# Patient Record
Sex: FEMALE | Race: WHITE | NOT HISPANIC OR LATINO | Employment: FULL TIME | URBAN - METROPOLITAN AREA
[De-identification: names, ages, dates, MRNs, and addresses within clinical notes are randomized per-mention and may not be internally consistent; named-entity substitution may affect disease eponyms.]

---

## 2023-04-06 LAB
C REACTIVE PROTEIN (MG/L) IN SER/PLAS: 0.74 MG/DL
RHEUMATOID FACTOR (IU/ML) IN SERUM OR PLASMA: <10 IU/ML (ref 0–15)
SEDIMENTATION RATE, ERYTHROCYTE: 20 MM/H (ref 0–30)

## 2023-04-10 LAB — CITRULLINE ANTIBODY, IGG: 3 UNITS (ref 0–19)

## 2023-10-08 DIAGNOSIS — R00.2 PALPITATIONS: ICD-10-CM

## 2023-10-09 RX ORDER — NALOXONE HYDROCHLORIDE 4 MG/.1ML
4 SPRAY NASAL AS NEEDED
COMMUNITY
Start: 2022-10-27

## 2023-10-09 RX ORDER — GABAPENTIN 300 MG/1
300 CAPSULE ORAL 3 TIMES DAILY
COMMUNITY
End: 2023-11-16 | Stop reason: WASHOUT

## 2023-10-09 RX ORDER — AMLODIPINE BESYLATE 2.5 MG/1
2.5 TABLET ORAL DAILY
COMMUNITY

## 2023-10-09 RX ORDER — OXYCODONE AND ACETAMINOPHEN 5; 325 MG/1; MG/1
1 TABLET ORAL 3 TIMES DAILY
COMMUNITY

## 2023-10-09 RX ORDER — DOXYCYCLINE 40 MG/1
40 CAPSULE ORAL
COMMUNITY
Start: 2013-09-25

## 2023-10-09 RX ORDER — LEVOTHYROXINE SODIUM 75 UG/1
75 TABLET ORAL DAILY
COMMUNITY
End: 2024-03-25 | Stop reason: SDUPTHER

## 2023-10-09 RX ORDER — FUROSEMIDE 20 MG/1
20 TABLET ORAL DAILY PRN
COMMUNITY
Start: 2023-05-25 | End: 2024-02-16

## 2023-10-09 RX ORDER — METOPROLOL SUCCINATE 25 MG/1
25 TABLET, EXTENDED RELEASE ORAL DAILY
COMMUNITY
End: 2023-11-16 | Stop reason: WASHOUT

## 2023-10-09 RX ORDER — SIMVASTATIN 40 MG/1
40 TABLET, FILM COATED ORAL NIGHTLY
COMMUNITY
End: 2024-04-19 | Stop reason: SDUPTHER

## 2023-10-09 RX ORDER — OXYCODONE AND ACETAMINOPHEN 7.5; 325 MG/1; MG/1
1 TABLET ORAL EVERY 6 HOURS PRN
COMMUNITY
Start: 2023-08-16 | End: 2023-11-15 | Stop reason: WASHOUT

## 2023-10-09 RX ORDER — METHOCARBAMOL 750 MG/1
750 TABLET, FILM COATED ORAL 4 TIMES DAILY PRN
COMMUNITY
End: 2023-11-15 | Stop reason: WASHOUT

## 2023-10-09 RX ORDER — DULOXETIN HYDROCHLORIDE 60 MG/1
60 CAPSULE, DELAYED RELEASE ORAL DAILY
COMMUNITY
End: 2024-04-05 | Stop reason: SDUPTHER

## 2023-10-10 RX ORDER — METOPROLOL TARTRATE 25 MG/1
25 TABLET, FILM COATED ORAL 2 TIMES DAILY
Qty: 60 TABLET | Refills: 11 | Status: SHIPPED | OUTPATIENT
Start: 2023-10-10 | End: 2023-10-16 | Stop reason: SDUPTHER

## 2023-10-16 DIAGNOSIS — R00.2 PALPITATIONS: ICD-10-CM

## 2023-10-16 RX ORDER — METOPROLOL TARTRATE 25 MG/1
25 TABLET, FILM COATED ORAL DAILY
Qty: 30 TABLET | Refills: 11 | Status: SHIPPED | OUTPATIENT
Start: 2023-10-16 | End: 2024-10-15

## 2023-11-15 PROBLEM — E78.2 MIXED HYPERLIPIDEMIA: Status: ACTIVE | Noted: 2023-11-15

## 2023-11-15 PROBLEM — S83.242A ACUTE MEDIAL MENISCUS TEAR OF LEFT KNEE: Status: ACTIVE | Noted: 2018-04-05

## 2023-11-15 PROBLEM — M19.90 OSTEOARTHRITIS: Status: ACTIVE | Noted: 2023-11-15

## 2023-11-15 PROBLEM — M79.7 FIBROMYALGIA MUSCLE PAIN: Status: ACTIVE | Noted: 2023-11-15

## 2023-11-15 PROBLEM — L30.9 DERMATITIS: Status: ACTIVE | Noted: 2023-11-15

## 2023-11-15 PROBLEM — N80.03 ADENOMYOSIS OF THE UTERUS: Status: ACTIVE | Noted: 2023-11-02

## 2023-11-15 PROBLEM — I49.3 PVC'S (PREMATURE VENTRICULAR CONTRACTIONS): Status: ACTIVE | Noted: 2017-02-08

## 2023-11-15 PROBLEM — L71.9 ROSACEA: Status: ACTIVE | Noted: 2023-11-15

## 2023-11-15 PROBLEM — I10 ESSENTIAL HYPERTENSION: Status: ACTIVE | Noted: 2023-11-15

## 2023-11-15 PROBLEM — K55.1 CHRONIC VASCULAR INSUFFICIENCY OF INTESTINE (MULTI): Status: ACTIVE | Noted: 2023-11-15

## 2023-11-15 PROBLEM — E03.9 HYPOTHYROIDISM: Status: ACTIVE | Noted: 2023-11-15

## 2023-11-15 PROBLEM — Z96.653: Status: ACTIVE | Noted: 2018-03-19

## 2023-11-15 PROBLEM — E66.812 OBESITY, CLASS II, BMI 35-39.9: Status: ACTIVE | Noted: 2023-07-20

## 2023-11-15 PROBLEM — D25.1 INTRAMURAL UTERINE FIBROID: Status: ACTIVE | Noted: 2023-11-02

## 2023-11-15 PROBLEM — M54.16 LUMBAR RADICULOPATHY: Status: ACTIVE | Noted: 2023-11-15

## 2023-11-15 PROBLEM — E66.9 OBESITY, CLASS II, BMI 35-39.9: Status: ACTIVE | Noted: 2023-07-20

## 2023-11-15 PROBLEM — M75.51 BURSITIS OF RIGHT SHOULDER: Status: ACTIVE | Noted: 2022-03-08

## 2023-11-15 PROBLEM — R07.9 CHEST PAIN: Status: ACTIVE | Noted: 2023-11-15

## 2023-11-15 PROBLEM — G47.33 OSA ON CPAP: Status: ACTIVE | Noted: 2021-07-29

## 2023-11-15 PROBLEM — K63.5 COLON POLYPS: Status: ACTIVE | Noted: 2023-11-15

## 2023-11-15 PROBLEM — R00.2 PALPITATIONS: Status: ACTIVE | Noted: 2017-02-08

## 2023-11-15 PROBLEM — M25.551 PAIN OF RIGHT HIP JOINT: Status: ACTIVE | Noted: 2023-11-15

## 2023-11-15 RX ORDER — CYCLOBENZAPRINE HCL 10 MG
10 TABLET ORAL 3 TIMES DAILY
COMMUNITY
Start: 2023-09-08 | End: 2024-02-27 | Stop reason: WASHOUT

## 2023-11-15 RX ORDER — NAPROXEN 250 MG/1
250 TABLET ORAL
COMMUNITY

## 2023-11-16 ENCOUNTER — OFFICE VISIT (OUTPATIENT)
Dept: CARDIOLOGY | Facility: CLINIC | Age: 58
End: 2023-11-16
Payer: COMMERCIAL

## 2023-11-16 VITALS
BODY MASS INDEX: 42.96 KG/M2 | DIASTOLIC BLOOD PRESSURE: 78 MMHG | WEIGHT: 218.8 LBS | HEIGHT: 60 IN | HEART RATE: 90 BPM | SYSTOLIC BLOOD PRESSURE: 124 MMHG

## 2023-11-16 DIAGNOSIS — R00.2 PALPITATIONS: ICD-10-CM

## 2023-11-16 DIAGNOSIS — I10 ESSENTIAL HYPERTENSION: Primary | ICD-10-CM

## 2023-11-16 DIAGNOSIS — I49.3 PVC'S (PREMATURE VENTRICULAR CONTRACTIONS): ICD-10-CM

## 2023-11-16 DIAGNOSIS — E78.2 MIXED HYPERLIPIDEMIA: ICD-10-CM

## 2023-11-16 PROCEDURE — 99213 OFFICE O/P EST LOW 20 MIN: CPT | Performed by: NURSE PRACTITIONER

## 2023-11-16 PROCEDURE — 3078F DIAST BP <80 MM HG: CPT | Performed by: NURSE PRACTITIONER

## 2023-11-16 PROCEDURE — 3074F SYST BP LT 130 MM HG: CPT | Performed by: NURSE PRACTITIONER

## 2023-11-16 RX ORDER — GABAPENTIN 600 MG/1
600 TABLET ORAL 2 TIMES DAILY
COMMUNITY
Start: 2023-10-25

## 2023-11-16 ASSESSMENT — ENCOUNTER SYMPTOMS
DIZZINESS: 0
PALPITATIONS: 0
RESPIRATORY NEGATIVE: 1
BACK PAIN: 1
NUMBNESS: 0
GASTROINTESTINAL NEGATIVE: 1
HEADACHES: 0
HEMATOLOGIC/LYMPHATIC NEGATIVE: 1
CARDIOVASCULAR NEGATIVE: 1
LIGHT-HEADEDNESS: 0
FACIAL ASYMMETRY: 0
CONFUSION: 0
ALLERGIC/IMMUNOLOGIC NEGATIVE: 1
ACTIVITY CHANGE: 0
ENDOCRINE NEGATIVE: 1
PSYCHIATRIC NEGATIVE: 1
APPETITE CHANGE: 0
EYES NEGATIVE: 1
ARTHRALGIAS: 1

## 2023-11-16 NOTE — PROGRESS NOTES
Светлана Hewitt is a 58 y.o. female     History Of Present Illness   Patient is here today for a 6 month follow up on labs      History of Present Illness  11/16/23:Mrs Hewitt is an obese female with a PMH of hypothyroidism, hypertension and osteoarthritis, here for a routine follow up of her hypertension and H/O palpitations. She denies any complaints of chest pain, shortness of breath, edema, dizziness or syncope.    5/25/23: Mrs Hewitt is an obese female with a PMH of hypothyroidism, hypertension and osteoarthritis, here for a routine follow up of her hypertension and H/O palpitations. She denies any complaints of chest pain, shortness of breath, edema, dizziness or syncope. She has very brief episodes of palpitations that only last seconds and resolve on their own. Her BP is well controlled. She will be followed by pain management for chronic back pain radiating to her legs. On occasion , she is having episodes of weight gain and lower extremity edema.      11/22/2022: Ms Hewitt is here for a follow up of her complaints of palpitations. At her last visit, she was started on metoprolol. She states her episodes of palpitations have been much improved. Her last episode was November 7, 2022. Her apple watch showed NSR during these events. She denies any complaints of chest pain or shortness of breath. She does have mild lower extremity edema.     10/25/22. Ms Hewitt is here for a follow up of her complaints of palpitations and to review the results of her testing. Since her last visit, she is complaining of significant palpitations that occur 7-10 times a day and last for <10 seconds. She denies any complaints of chest pain, shortness of breath, lower extremity edema, dizziness or syncope.       9/1/22 Mrs Hewitt is an obese female with a PMH of hypothyroidism, hypertension and osteoarthritis, here for concern of /pronounced palpitations that started 1 1/2 months ago and occur frequently throughout  the day with chest tightness and flushing of her skin. She feels the palpitations are more pronounced when she lays done. these episodes are lsting 3-5 seconds.  She denies any symptoms when she exercises..      She denies any H/O MI, CAD, HF, DM, CVA OR TIA     No history of COVID         Social HX          Family HX  Her father has a H/O CAD with CABG at a young age.       Review Of Systems   Review of Systems   Constitutional:  Negative for activity change and appetite change.   Eyes: Negative.    Respiratory: Negative.     Cardiovascular: Negative.  Negative for chest pain, palpitations and leg swelling.   Gastrointestinal: Negative.    Endocrine: Negative.  Negative for cold intolerance and heat intolerance.   Genitourinary: Negative.    Musculoskeletal:  Positive for arthralgias and back pain.   Skin: Negative.  Negative for pallor.   Allergic/Immunologic: Negative.    Neurological:  Negative for dizziness, syncope, facial asymmetry, light-headedness, numbness and headaches.   Hematological: Negative.    Psychiatric/Behavioral: Negative.  Negative for behavioral problems and confusion.           Allergies  Allergies   Allergen Reactions    Codeine Hives    Cephalexin Rash    Ciprofloxacin Rash    Nabumetone Rash    Penicillins Rash    Sulfa (Sulfonamide Antibiotics) Rash    Valdecoxib Rash          Vitals  There were no vitals taken for this visit.        Physical Exam  Physical Exam  Constitutional:       Appearance: Normal appearance. She is obese.   HENT:      Head: Normocephalic and atraumatic.      Nose: Nose normal.      Mouth/Throat:      Mouth: Mucous membranes are dry.   Eyes:      Extraocular Movements: Extraocular movements intact.      Pupils: Pupils are equal, round, and reactive to light.   Cardiovascular:      Rate and Rhythm: Normal rate and regular rhythm.      Pulses: Normal pulses.      Heart sounds: No murmur heard.     Friction rub present. No gallop.   Pulmonary:      Effort: Pulmonary  effort is normal.      Breath sounds: Normal breath sounds.   Abdominal:      Palpations: Abdomen is soft.   Musculoskeletal:         General: Normal range of motion.      Cervical back: Normal range of motion and neck supple.   Skin:     General: Skin is warm and dry.      Capillary Refill: Capillary refill takes less than 2 seconds.   Neurological:      General: No focal deficit present.      Mental Status: She is alert and oriented to person, place, and time. Mental status is at baseline.   Psychiatric:         Mood and Affect: Mood normal.         Behavior: Behavior normal.            Current/Home Meds    Current Outpatient Medications:     amLODIPine (Norvasc) 2.5 mg tablet, Take 1 tablet (2.5 mg) by mouth once daily., Disp: , Rfl:     cyclobenzaprine (Flexeril) 10 mg tablet, Take 1 tablet (10 mg) by mouth 3 times a day., Disp: , Rfl:     DULoxetine (Cymbalta) 60 mg DR capsule, Take 1 capsule (60 mg) by mouth once daily., Disp: , Rfl:     furosemide (Lasix) 20 mg tablet, Take 1 tablet (20 mg) by mouth once daily as needed (FOR EDEMA OR WEIGHT GAIN OF MORE THAN 3LBS)., Disp: , Rfl:     gabapentin (Neurontin) 300 mg capsule, Take 1 capsule (300 mg) by mouth 3 times a day., Disp: , Rfl:     levothyroxine (Synthroid, Levoxyl) 75 mcg tablet, Take 1 tablet (75 mcg) by mouth once daily., Disp: , Rfl:     metoprolol succinate XL (Toprol-XL) 25 mg 24 hr tablet, Take 1 tablet (25 mg) by mouth once daily., Disp: , Rfl:     metoprolol tartrate (Lopressor) 25 mg tablet, Take 1 tablet (25 mg) by mouth once daily., Disp: 30 tablet, Rfl: 11    naloxone (Narcan) 4 mg/0.1 mL nasal spray, Administer 1 spray (4 mg) into affected nostril(s) if needed., Disp: , Rfl:     naproxen (Naprosyn) 250 mg tablet, Take 1 tablet (250 mg) by mouth., Disp: , Rfl:     Oracea 40 mg DR capsule, Take 1 capsule (40 mg) by mouth once daily., Disp: , Rfl:     oxyCODONE-acetaminophen (Percocet) 5-325 mg tablet, Take 1 tablet by mouth 3 times a day.,  Disp: , Rfl:     simvastatin (Zocor) 40 mg tablet, Take 1 tablet (40 mg) by mouth once daily at bedtime., Disp: , Rfl:        Cardiac Service Results:  10/14/22: CT calcium score-0     Sept 13, 2022 ECHO; Normal LV systolic function with an EF of 55-60%       9/1/2022; HOLTER MONITOR  MIN HR 55  MAX   AVERAGE HR 83  NO EPISODES OF A-FIB, SVT, HIGH DEGREE AV BLOCKS OR V TACH  1473 PVCS  19 PSVC  40 REPORTED EVENTS        Assessment/Plan      PALPITATIONS; Related to frequent PVC's. Now well controlled Will continue metoprolol succinate 25mg po daily and have her follow up in six months to recheck her symptoms.      10/14/22: CT calcium score-0     Sept 13, 2022 ECHO; Normal LV systolic function with an EF of 55-60%        9/1/2022; HOLTER MONITOR  MIN HR 55  MAX   AVERAGE HR 83  NO EPISODES OF A-FIB, SVT, HIGH DEGREE AV BLOCKS OR V TACH  1473 PVCS  19 PSVC  40 REPORTED EVENTS     CHOLESTEROL 163  HDL 67  LDL 96  TG 87    Hypertension: BP is well controlled at 124/78. Will place an order for prn lasix for edema or weight gain more than 3 lbs. She will follow up with me in 6 months or sooner for any questions or concerns.        I would like her to have a lipid panel and CMP completed prior to her office visit. She can call me if she has any questions or concerns.

## 2024-02-16 DIAGNOSIS — I10 ESSENTIAL HYPERTENSION: Primary | ICD-10-CM

## 2024-02-16 DIAGNOSIS — E78.2 MIXED HYPERLIPIDEMIA: ICD-10-CM

## 2024-02-16 RX ORDER — FUROSEMIDE 20 MG/1
TABLET ORAL
Qty: 30 TABLET | Refills: 1 | Status: SHIPPED | OUTPATIENT
Start: 2024-02-16

## 2024-02-27 ENCOUNTER — OFFICE VISIT (OUTPATIENT)
Dept: PRIMARY CARE | Facility: CLINIC | Age: 59
End: 2024-02-27
Payer: COMMERCIAL

## 2024-02-27 VITALS
TEMPERATURE: 97.1 F | DIASTOLIC BLOOD PRESSURE: 73 MMHG | HEART RATE: 75 BPM | OXYGEN SATURATION: 96 % | WEIGHT: 206.9 LBS | BODY MASS INDEX: 40.62 KG/M2 | HEIGHT: 60 IN | SYSTOLIC BLOOD PRESSURE: 106 MMHG

## 2024-02-27 DIAGNOSIS — I10 ESSENTIAL HYPERTENSION: Primary | ICD-10-CM

## 2024-02-27 DIAGNOSIS — E03.9 HYPOTHYROIDISM, UNSPECIFIED TYPE: ICD-10-CM

## 2024-02-27 DIAGNOSIS — G89.29 CHRONIC BILATERAL LOW BACK PAIN WITHOUT SCIATICA: ICD-10-CM

## 2024-02-27 DIAGNOSIS — I49.3 PVC'S (PREMATURE VENTRICULAR CONTRACTIONS): ICD-10-CM

## 2024-02-27 DIAGNOSIS — G47.33 OSA ON CPAP: ICD-10-CM

## 2024-02-27 DIAGNOSIS — E78.2 MIXED HYPERLIPIDEMIA: ICD-10-CM

## 2024-02-27 DIAGNOSIS — M54.50 CHRONIC BILATERAL LOW BACK PAIN WITHOUT SCIATICA: ICD-10-CM

## 2024-02-27 PROBLEM — E66.812 OBESITY, CLASS II, BMI 35-39.9: Status: RESOLVED | Noted: 2023-07-20 | Resolved: 2024-02-27

## 2024-02-27 PROBLEM — R07.9 CHEST PAIN: Status: RESOLVED | Noted: 2023-11-15 | Resolved: 2024-02-27

## 2024-02-27 PROBLEM — E66.9 OBESITY, CLASS II, BMI 35-39.9: Status: RESOLVED | Noted: 2023-07-20 | Resolved: 2024-02-27

## 2024-02-27 PROCEDURE — 3074F SYST BP LT 130 MM HG: CPT | Performed by: INTERNAL MEDICINE

## 2024-02-27 PROCEDURE — 99214 OFFICE O/P EST MOD 30 MIN: CPT | Performed by: INTERNAL MEDICINE

## 2024-02-27 PROCEDURE — 1036F TOBACCO NON-USER: CPT | Performed by: INTERNAL MEDICINE

## 2024-02-27 PROCEDURE — 3078F DIAST BP <80 MM HG: CPT | Performed by: INTERNAL MEDICINE

## 2024-02-27 RX ORDER — METHOCARBAMOL 750 MG/1
750 TABLET, FILM COATED ORAL 3 TIMES DAILY PRN
COMMUNITY

## 2024-02-27 RX ORDER — TIRZEPATIDE 2.5 MG/.5ML
INJECTION, SOLUTION SUBCUTANEOUS
COMMUNITY
Start: 2024-01-29 | End: 2024-02-27 | Stop reason: ALTCHOICE

## 2024-02-27 RX ORDER — ESTRADIOL 0.1 MG/G
CREAM VAGINAL
COMMUNITY
End: 2024-02-27 | Stop reason: ALTCHOICE

## 2024-02-27 RX ORDER — TERBINAFINE HYDROCHLORIDE 250 MG/1
250 TABLET ORAL
COMMUNITY
Start: 2024-02-16

## 2024-02-27 ASSESSMENT — PATIENT HEALTH QUESTIONNAIRE - PHQ9
1. LITTLE INTEREST OR PLEASURE IN DOING THINGS: NOT AT ALL
SUM OF ALL RESPONSES TO PHQ9 QUESTIONS 1 AND 2: 0
2. FEELING DOWN, DEPRESSED OR HOPELESS: NOT AT ALL

## 2024-02-27 NOTE — PROGRESS NOTES
Patient ID: Светлана Hewitt is a 58 y.o. female who presents for establishing care.      HPI  Patient is getting established here  Has hypertension and is on amlodipine and metoprolol.  She also sees cardiologist.  Takes Lasix occasionally for water retention  Under care of pain management for chronic back pain and fibromyalgia.  Symptoms are managed with current treatment  Sees endocrinology for weight management.  Last A1c 6.5.  Has lost some weight on GLP-1  Duloxetine is also for her chronic pain.  Tolerated well  Taking thyroxine as prescribed.  TSH normal in September  No symptoms of constipation or  worsening fatigue  On statin.  No myalgias or side effects from this  Sees gynecologist.  Will be seeing for follow-up Pap.  Had mammogram  Sees gastroenterologist at [facility] for colonoscopy.  Up-to-date  Has sleep apnea and tolerate CPAP.  Wakes up refreshed  Review of Systems  GENERAL;:Denies weight changes,fatigue,fever,chills or sweats  HEENT:Denies headache,sorethroat,sinus drainage ,vision or hearing issues  CVS:No chest pain,sob or palpitation.No leg swelling  RESP:No c/c cough,cp,sob or wheezing  GI:NO abdominal pain,nausea,heartburn,vomiting,or bowel changes.  :No painful urination,frequency or hematuria.No incontinence  NEURO:No dizziness,weakness,numbness or tingling.No memory issues  PSYCH:No anxiety,depression or sleep issues       Blood pressure 106/73, pulse 75, temperature 36.2 °C (97.1 °F), height 1.524 m (5'), weight 93.8 kg (206 lb 14.4 oz), SpO2 96 %.       Physical Exam  Gen. patient is not in acute distress  HEENT: No pallor or icterus.  Neck: Supple,no lAD,No JVD  CVS: S1, S2, regular in rate and rhythm. No peripheral edema.  Chest: Clear to auscultation bilateral. Good air entry.  Abd:Soft,nontender  Extremities no edema or tenderness.  Neuro: Grossly nonfocal, alert and oriented.  Psych: Mood and affect normal, good judgment and insight         Assessment/Plan   Problem List Items  Addressed This Visit       Chronic bilateral low back pain without sciatica    Essential hypertension - Primary    Mixed hyperlipidemia    Hypothyroidism    PVC's (premature ventricular contractions)    JULIANA on CPAP     Blood pressure is controlled.  Continue same treatment.  Reviewed recent kidney function done in September which was fine  Continue meds  Continue statin.  Lipids okay  TSH normal in September.  Continue medication  Sees cardiologist for PVCs.  Symptoms controlled with metoprolol  Continue CPAP use  See pain management as scheduled  Make follow-up with gynecologist  Continue weight loss plan.  She is on GLP-1  So far tolerated well.  Healthy diet habits discussed  She declines Shingrix since she had shingles twice  Follow-up in September for wellness exam      Rubi Cai MD

## 2024-03-25 DIAGNOSIS — E03.9 HYPOTHYROIDISM, UNSPECIFIED TYPE: Primary | ICD-10-CM

## 2024-03-25 RX ORDER — LEVOTHYROXINE SODIUM 75 UG/1
TABLET ORAL
Qty: 96 TABLET | Refills: 3 | Status: SHIPPED | OUTPATIENT
Start: 2024-03-25

## 2024-04-17 DIAGNOSIS — M54.32 CHRONIC SCIATICA, LEFT: Primary | ICD-10-CM

## 2024-04-19 DIAGNOSIS — E78.2 MIXED HYPERLIPIDEMIA: Primary | ICD-10-CM

## 2024-04-19 RX ORDER — SIMVASTATIN 40 MG/1
40 TABLET, FILM COATED ORAL NIGHTLY
Qty: 90 TABLET | Refills: 3 | Status: SHIPPED | OUTPATIENT
Start: 2024-04-19 | End: 2025-04-19

## 2024-05-06 DIAGNOSIS — M79.7 FIBROMYALGIA MUSCLE PAIN: ICD-10-CM

## 2024-05-06 RX ORDER — DULOXETIN HYDROCHLORIDE 60 MG/1
60 CAPSULE, DELAYED RELEASE ORAL DAILY
Qty: 90 CAPSULE | Refills: 1 | Status: SHIPPED | OUTPATIENT
Start: 2024-05-06 | End: 2024-11-02

## 2024-05-28 DIAGNOSIS — U07.1 COVID-19: Primary | ICD-10-CM

## 2024-05-28 RX ORDER — NIRMATRELVIR AND RITONAVIR 300-100 MG
3 KIT ORAL 2 TIMES DAILY
Qty: 30 TABLET | Refills: 0 | Status: SHIPPED | OUTPATIENT
Start: 2024-05-28 | End: 2024-06-02

## 2024-08-14 DIAGNOSIS — R00.2 PALPITATIONS: Primary | ICD-10-CM

## 2024-08-19 RX ORDER — METOPROLOL TARTRATE 25 MG/1
25 TABLET, FILM COATED ORAL DAILY
Qty: 90 TABLET | Refills: 0 | Status: SHIPPED | OUTPATIENT
Start: 2024-08-19 | End: 2025-08-19

## 2024-09-08 ASSESSMENT — PROMIS GLOBAL HEALTH SCALE
CARRYOUT_SOCIAL_ACTIVITIES: VERY GOOD
RATE_SOCIAL_SATISFACTION: EXCELLENT
RATE_MENTAL_HEALTH: EXCELLENT
RATE_AVERAGE_FATIGUE: MODERATE
RATE_QUALITY_OF_LIFE: EXCELLENT
RATE_GENERAL_HEALTH: GOOD
RATE_PHYSICAL_HEALTH: GOOD
CARRYOUT_PHYSICAL_ACTIVITIES: MOSTLY
RATE_AVERAGE_PAIN: 4
EMOTIONAL_PROBLEMS: NEVER

## 2024-09-10 ENCOUNTER — APPOINTMENT (OUTPATIENT)
Dept: PRIMARY CARE | Facility: CLINIC | Age: 59
End: 2024-09-10
Payer: COMMERCIAL

## 2024-09-10 VITALS
DIASTOLIC BLOOD PRESSURE: 67 MMHG | OXYGEN SATURATION: 97 % | HEIGHT: 60 IN | BODY MASS INDEX: 33.99 KG/M2 | WEIGHT: 173.1 LBS | SYSTOLIC BLOOD PRESSURE: 100 MMHG | HEART RATE: 72 BPM

## 2024-09-10 DIAGNOSIS — Z00.00 ANNUAL PHYSICAL EXAM: Primary | ICD-10-CM

## 2024-09-10 PROCEDURE — 1036F TOBACCO NON-USER: CPT | Performed by: INTERNAL MEDICINE

## 2024-09-10 PROCEDURE — 90656 IIV3 VACC NO PRSV 0.5 ML IM: CPT | Performed by: INTERNAL MEDICINE

## 2024-09-10 PROCEDURE — 3008F BODY MASS INDEX DOCD: CPT | Performed by: INTERNAL MEDICINE

## 2024-09-10 PROCEDURE — 90471 IMMUNIZATION ADMIN: CPT | Performed by: INTERNAL MEDICINE

## 2024-09-10 PROCEDURE — 3078F DIAST BP <80 MM HG: CPT | Performed by: INTERNAL MEDICINE

## 2024-09-10 PROCEDURE — 99396 PREV VISIT EST AGE 40-64: CPT | Performed by: INTERNAL MEDICINE

## 2024-09-10 PROCEDURE — 3074F SYST BP LT 130 MM HG: CPT | Performed by: INTERNAL MEDICINE

## 2024-09-10 RX ORDER — TIRZEPATIDE 5 MG/.5ML
5 INJECTION, SOLUTION SUBCUTANEOUS
COMMUNITY
Start: 2024-06-19

## 2024-09-10 NOTE — PROGRESS NOTES
Subjective   Patient ID: Светлана Hewitt is a 59 y.o. female who presents for Annual Exam (Would like flu vaccine.).  HPI    Patient is here for annual exam  Does not have any new concerns today.lost weight with meds.  sees ophthalmologist regularly.    Consumes healthy diet    does regular exercise  pregnancy historyg0  No bladder symptoms  Cervical cancer screen current normal 5/24  No history of abnormal Pap  Mammogram due in nov  Colonoscopy 2021,rpt 5 yrs  Medical conditions:On bp meds  No dizziness  Sees cardiology  Sees pain mgt.pain fairly controlled  Seen endo.TSH high in may  Cmp and lipids nl  A1c was 5.7  Uses cpap daily  Vaccines:    Review of Systems  General: has  fatigue, no fever, chills, or night sweats.  HEENT: No headache, dizziness, syncope. No blurred vision, double vision. No hearing loss, or ringing. No nasal discharge, sinus problems. No loose teeth, sore throat, hoarseness or neck stiffness.   Breast: No pain or discharge. No mass felt.   Respiratory: No cough, mucous in throat, hemoptysis, wheezing, or shortness of breath. No snoring.  Cardiac: No chest pain, some times pvc .noorthopnea. No leg swelling or claudication pain.   Gastrointestinal: No indigestion, heartburn, nausea, vomiting, diarrhea, constipation, rectal bleeding or hemorrhoids.has gas.  Genitourinary: No UTI symptoms, stones, incontinence.  OBGYN: No abnormal bleeding, No pelvic pain or bloating.  Endocrine: No excess thirst or urination.  Hematopoietic: No anemia, easy bruising or bleeding. No history of blood transfusion.  Neuro: No localized weakness, numbness or tingling. No tremor, history of seizure. No history of memory problems.  Psychological: No anxiety, depression or sleep disturbance.    HISTORY  Social History     Socioeconomic History    Marital status:      Spouse name: Not on file    Number of children: Not on file    Years of education: Not on file    Highest education level: Not on file    Occupational History    Not on file   Tobacco Use    Smoking status: Never    Smokeless tobacco: Never   Substance and Sexual Activity    Alcohol use: Never    Drug use: Never    Sexual activity: Not on file   Other Topics Concern    Not on file   Social History Narrative    Not on file     Social Determinants of Health     Financial Resource Strain: Low Risk  (7/19/2023)    Received from [facility]    Overall Financial Resource Strain (CARDIA)     Difficulty of Paying Living Expenses: Not hard at all   Food Insecurity: No Food Insecurity (7/19/2023)    Received from [facility]    Hunger Vital Sign     Worried About Running Out of Food in the Last Year: Never true     Ran Out of Food in the Last Year: Never true   Transportation Needs: No Transportation Needs (7/19/2023)    Received from [facility]    PRAPARE - Transportation     Lack of Transportation (Medical): No     Lack of Transportation (Non-Medical): No   Physical Activity: Insufficiently Active (7/19/2023)    Received from [facility]    Exercise Vital Sign     Days of Exercise per Week: 2 days     Minutes of Exercise per Session: 30 min   Stress: No Stress Concern Present (7/19/2023)    Received from [facility]    [employer] - Occupational Stress Questionnaire     Feeling of Stress : Only a little   Social Connections: Moderately Isolated (7/19/2023)    Received from [facility]    Social Connection and Isolation Panel [NHANES]     Frequency of Communication with Friends and Family: More than three times a week     Frequency of Social Gatherings with Friends and Family: Three times a week     Attends Religion Services: Never     Active Member of Clubs or Organizations: No     Attends Club or Organization Meetings: Never     Marital Status:    Intimate Partner Violence: Not on  "file   Housing Stability: Low Risk  (7/19/2023)    [facility]    Housing Stability Vital Sign     Unable to Pay for Housing in the Last Year: No     Number of Places Lived in the Last Year: 1     Unstable Housing in the Last Year: No     Family History   Problem Relation Name Age of Onset    Osteoporosis Mother      COPD Mother      Coronary artery disease Father      Breast cancer Mother's Sister      Ovarian cancer Paternal Grandmother       Past Medical History:   Diagnosis Date    Personal history of other diseases of the circulatory system     History of hypertension    Personal history of other endocrine, nutritional and metabolic disease 01/27/2014    History of hypercholesterolemia    Personal history of other endocrine, nutritional and metabolic disease 01/27/2014    History of hypothyroidism    Vascular disorder of intestine, unspecified (Multi)     Ischemic colitis    Zoster without complications 12/30/2022    Shingles     Past Surgical History:   Procedure Laterality Date    COLONOSCOPY  01/15/2014    Complete Colonoscopy    KNEE ARTHROPLASTY  01/28/2014    Knee Arthroplasty     @VACCINES  Objective   /67   Pulse 72   Ht 1.524 m (5')   Wt 78.5 kg (173 lb 1.6 oz)   SpO2 97%   BMI 33.81 kg/m²      No results found for: \"WBC\", \"HGB\", \"HCT\", \"MCV\", \"PLT\"PAP@MAMMOGRAM  Physical Exam  In general, well-appearing, not in acute distress, alert and oriented.  HEENT: No pallor or icterus  Neck: No lymphadenopathy, no stiffness.  Supple.  .No JVD.no goiter.  Chest: Clear to auscultation, good air entry.  CVS: S1 and S2 regular.  No murmur, no gallop, S3 or S4.  No peripheral edema.  No carotid bruit.  Abdomen: Soft, no tenderness, no hepatosplenomegaly.  Extremities: No calf tenderness.  No peripheral edema.   Neuro: No focal deficits.  Psych: Mood and affect normal.  Good judgment and insight   Assessment/Plan   Problem List Items Addressed This Visit       Annual " physical exam - Primary        Wellness exam and counseling completed  Recently had Pap in May and normal  Mammogram due in November  Colonoscopy due in 2026  Blood pressure is tightly controlled and has lost weight.  Will discontinue amlodipine  Continue to monitor BP at home  Continue healthy diet  Has endocrinology follow-up in 3 months.  TSH slightly high in May  Continue pain management  Continue CPAP    Follow-up in 6 months

## 2024-11-14 ENCOUNTER — APPOINTMENT (OUTPATIENT)
Dept: CARDIOLOGY | Facility: CLINIC | Age: 59
End: 2024-11-14
Payer: COMMERCIAL

## 2024-12-12 ENCOUNTER — APPOINTMENT (OUTPATIENT)
Dept: CARDIOLOGY | Facility: CLINIC | Age: 59
End: 2024-12-12
Payer: COMMERCIAL

## 2024-12-12 VITALS
OXYGEN SATURATION: 98 % | WEIGHT: 169 LBS | DIASTOLIC BLOOD PRESSURE: 76 MMHG | BODY MASS INDEX: 33.01 KG/M2 | SYSTOLIC BLOOD PRESSURE: 118 MMHG | HEART RATE: 72 BPM

## 2024-12-12 DIAGNOSIS — R00.2 PALPITATIONS: ICD-10-CM

## 2024-12-12 PROCEDURE — 99214 OFFICE O/P EST MOD 30 MIN: CPT

## 2024-12-12 PROCEDURE — 3074F SYST BP LT 130 MM HG: CPT

## 2024-12-12 PROCEDURE — 1036F TOBACCO NON-USER: CPT

## 2024-12-12 PROCEDURE — 3078F DIAST BP <80 MM HG: CPT

## 2024-12-12 RX ORDER — METOPROLOL TARTRATE 25 MG/1
25 TABLET, FILM COATED ORAL DAILY
Qty: 90 TABLET | Refills: 3 | Status: SHIPPED | OUTPATIENT
Start: 2024-12-12 | End: 2025-12-12

## 2024-12-12 RX ORDER — TIRZEPATIDE 7.5 MG/.5ML
7.5 INJECTION, SOLUTION SUBCUTANEOUS
COMMUNITY
Start: 2024-11-22

## 2024-12-12 RX ORDER — EFINACONAZOLE 100 MG/ML
SOLUTION TOPICAL
COMMUNITY
Start: 2024-11-14

## 2024-12-12 NOTE — PROGRESS NOTES
Chief Complaint:   No chief complaint on file.     History Of Present Illness:    Cherelle Hewitt is a 59 y.o. female with PMHx of HTN, palpitations, hypothyroidism, and OA, presenting today for follow-up.  Patient reports she is feeling well since last seen in our office.  She does report that she still has occasional palpitation that are very infrequent and occur about 1-2 x/month.   Patient tells me that her physical activity limited due to recent right ankle injury.  However, prior to her injury she was walking daily.  She does wear a smart watch hitting about 8K steps/per day. She denies any CP, SOB, lightheadedness, dizziness, syncope, orthopnea, PND, lower extremity edema.  I did ask her to increase her daily activity once her injury is healed she is to attempt to hit 10K steps most days of the week.     Last Recorded Vitals:  Vitals:    12/12/24 0954   BP: 118/76   BP Location: Left arm   Patient Position: Sitting   Pulse: 72   SpO2: 98%   Weight: 76.7 kg (169 lb)       Past Medical History:  She has a past medical history of Personal history of other diseases of the circulatory system, Personal history of other endocrine, nutritional and metabolic disease (01/27/2014), Personal history of other endocrine, nutritional and metabolic disease (01/27/2014), Vascular disorder of intestine, unspecified, and Zoster without complications (12/30/2022).    Past Surgical History:  She has a past surgical history that includes Colonoscopy (01/15/2014) and Knee Arthroplasty (01/28/2014).      Social History:  She reports that she has never smoked. She has never used smokeless tobacco. She reports that she does not drink alcohol and does not use drugs.    Family History:  Family History   Problem Relation Name Age of Onset    Osteoporosis Mother      COPD Mother      Coronary artery disease Father      Breast cancer Mother's Sister      Ovarian cancer Paternal Grandmother          Allergies:  Codeine, Cephalexin,  "Ciprofloxacin, Nabumetone, Penicillins, Sulfa (sulfonamide antibiotics), and Valdecoxib    Outpatient Medications:  Current Outpatient Medications   Medication Instructions    DULoxetine (CYMBALTA) 60 mg, oral, Daily    furosemide (Lasix) 20 mg tablet TAKE 1 TABLET BY MOUTH AS NEEDED FOR EDEMA OR WEIGHT GAIN OF MORE THAN 3 LBS    gabapentin (NEURONTIN) 600 mg, 2 times daily    Jublia 10 % solution with applicator APPLY EVERY DAY TO THE AFFECTED AREA UNTIL CLEAR    levothyroxine (Synthroid, Levoxyl) 75 mcg tablet TAKE 1 TABLET BY MOUTH DAILY SUN-FRI AND 1.5 TABLETS ON SATURDAYS    methocarbamol (ROBAXIN) 750 mg, 3 times daily PRN    metoprolol tartrate (LOPRESSOR) 25 mg, oral, Daily    naloxone (NARCAN) 4 mg, As needed    naproxen (NAPROSYN) 250 mg    Oracea 40 mg, Daily RT    oxyCODONE-acetaminophen (Percocet) 5-325 mg tablet 1 tablet, 3 times daily    simvastatin (ZOCOR) 40 mg, oral, Nightly    Zepbound 7.5 mg, Every 7 days       Physical Exam:  General: no acute distress  HEENT: EOMI, no scleral icterus.  Lungs: Clear to auscultation bilaterally without wheezing, rales, or rhonchi.  Cardiovascular: Regular rhythm and rate. Normal S1 and S2. No murmurs, rubs, or gallops are appreciated. JVP normal.  Abdomen: Soft, nontender, nondistended. Bowel sounds present.  Extremities: Warm and well perfused with equal 2+ pulses bilaterally.  No edema.  Neurologic: Alert and oriented x3.      Last Labs:  CBC -  No results found for: \"WBC\", \"HGB\", \"HCT\", \"MCV\", \"PLT\"    CMP -  Lab Results   Component Value Date    CALCIUM 9.6 06/21/2022       LIPID PANEL -   Lab Results   Component Value Date    CHOL 165 06/21/2022    TRIG 98 06/21/2022    HDL 68.5 06/21/2022    CHHDL 2.4 06/21/2022    LDLF 77 06/21/2022    VLDL 20 06/21/2022       RENAL FUNCTION PANEL -   Lab Results   Component Value Date    GLUCOSE 112 (H) 06/21/2022     06/21/2022    K 4.9 06/21/2022     06/21/2022    CO2 30 06/21/2022    ANIONGAP 13 06/21/2022 "    BUN 12 2022    CREATININE 0.87 2022    CALCIUM 9.6 2022        Lab Results   Component Value Date    HGBA1C 5.7 (H) 2024       Last Cardiology Tests:  ECG:  ECG 12 lead (Clinic Performed) 2024  NSR    Echo:  TTE 2022   1. Left ventricular systolic function is normal with a 55-60% estimated ejection fraction.   2. Normal cardiac chamber dimensions.    Cath:  No results found for this or any previous visit from the past 1095 days.    Stress Test:  No results found for this or any previous visit from the past 1095 days.    Cardiac Imaging:  CT heart calcium scoring wo IV contrast 10/13/2022  Left Main Coronary: 0.  Left Anterior Descendin.  Left Circumflex: 0.  Right Coronary Artery: 0.  Total: 0.    I have personally reviewed most recent PCP, cardiology, vascular, studies and/or documentation.      Assessment/Plan   HTN, well-controlled, today 118/76. Continue current medications.  She does take as needed Lasix for BLE edema.     Palpitations, occurred very infrequently about 1-2 times a month.  Continue metoprolol daily.    HLD, 2024 LDL 85, HDL 63, trig 70, on Zocor 40 mg daily.  CT calcium score 0 (10/13/22).  Labs monitored by her PCP.    Follow up with me in 1 year.     Gely Jeffries, APRN-CNP

## 2024-12-16 DIAGNOSIS — M79.7 FIBROMYALGIA MUSCLE PAIN: ICD-10-CM

## 2024-12-16 RX ORDER — DULOXETIN HYDROCHLORIDE 60 MG/1
60 CAPSULE, DELAYED RELEASE ORAL DAILY
Qty: 90 CAPSULE | Refills: 1 | Status: SHIPPED | OUTPATIENT
Start: 2024-12-16

## 2025-01-10 DIAGNOSIS — R59.1 LYMPHADENOPATHY: Primary | ICD-10-CM

## 2025-01-11 ENCOUNTER — LAB (OUTPATIENT)
Dept: LAB | Facility: LAB | Age: 60
End: 2025-01-11
Payer: COMMERCIAL

## 2025-01-11 DIAGNOSIS — R59.1 LYMPHADENOPATHY: ICD-10-CM

## 2025-01-11 LAB
BASOPHILS # BLD AUTO: 0.08 X10*3/UL (ref 0–0.1)
BASOPHILS NFR BLD AUTO: 1.5 %
EOSINOPHIL # BLD AUTO: 0.17 X10*3/UL (ref 0–0.7)
EOSINOPHIL NFR BLD AUTO: 3.2 %
ERYTHROCYTE [DISTWIDTH] IN BLOOD BY AUTOMATED COUNT: 11.9 % (ref 11.5–14.5)
HCT VFR BLD AUTO: 38.6 % (ref 36–46)
HGB BLD-MCNC: 12.3 G/DL (ref 12–16)
IMM GRANULOCYTES # BLD AUTO: 0.01 X10*3/UL (ref 0–0.7)
IMM GRANULOCYTES NFR BLD AUTO: 0.2 % (ref 0–0.9)
LYMPHOCYTES # BLD AUTO: 1.71 X10*3/UL (ref 1.2–4.8)
LYMPHOCYTES NFR BLD AUTO: 32.6 %
MCH RBC QN AUTO: 30.1 PG (ref 26–34)
MCHC RBC AUTO-ENTMCNC: 31.9 G/DL (ref 32–36)
MCV RBC AUTO: 94 FL (ref 80–100)
MONOCYTES # BLD AUTO: 0.57 X10*3/UL (ref 0.1–1)
MONOCYTES NFR BLD AUTO: 10.9 %
NEUTROPHILS # BLD AUTO: 2.7 X10*3/UL (ref 1.2–7.7)
NEUTROPHILS NFR BLD AUTO: 51.6 %
NRBC BLD-RTO: 0 /100 WBCS (ref 0–0)
PLATELET # BLD AUTO: 242 X10*3/UL (ref 150–450)
RBC # BLD AUTO: 4.09 X10*6/UL (ref 4–5.2)
WBC # BLD AUTO: 5.2 X10*3/UL (ref 4.4–11.3)

## 2025-01-11 PROCEDURE — 85025 COMPLETE CBC W/AUTO DIFF WBC: CPT

## 2025-01-21 ENCOUNTER — APPOINTMENT (OUTPATIENT)
Dept: PRIMARY CARE | Facility: CLINIC | Age: 60
End: 2025-01-21
Payer: COMMERCIAL

## 2025-03-11 ENCOUNTER — APPOINTMENT (OUTPATIENT)
Dept: PRIMARY CARE | Facility: CLINIC | Age: 60
End: 2025-03-11
Payer: COMMERCIAL

## 2025-03-25 ENCOUNTER — APPOINTMENT (OUTPATIENT)
Dept: PRIMARY CARE | Facility: CLINIC | Age: 60
End: 2025-03-25
Payer: COMMERCIAL

## 2025-04-03 ENCOUNTER — APPOINTMENT (OUTPATIENT)
Dept: PRIMARY CARE | Facility: CLINIC | Age: 60
End: 2025-04-03
Payer: COMMERCIAL

## 2025-04-03 VITALS
HEIGHT: 60 IN | DIASTOLIC BLOOD PRESSURE: 72 MMHG | WEIGHT: 164 LBS | SYSTOLIC BLOOD PRESSURE: 106 MMHG | HEART RATE: 69 BPM | BODY MASS INDEX: 32.2 KG/M2 | OXYGEN SATURATION: 96 %

## 2025-04-03 DIAGNOSIS — I10 ESSENTIAL HYPERTENSION: Primary | ICD-10-CM

## 2025-04-03 DIAGNOSIS — G47.33 OSA ON CPAP: ICD-10-CM

## 2025-04-03 DIAGNOSIS — N95.2 ATROPHIC VAGINITIS: ICD-10-CM

## 2025-04-03 DIAGNOSIS — Z12.31 ENCOUNTER FOR SCREENING MAMMOGRAM FOR BREAST CANCER: ICD-10-CM

## 2025-04-03 DIAGNOSIS — E78.2 MIXED HYPERLIPIDEMIA: ICD-10-CM

## 2025-04-03 DIAGNOSIS — E03.9 HYPOTHYROIDISM, UNSPECIFIED TYPE: ICD-10-CM

## 2025-04-03 PROCEDURE — 99215 OFFICE O/P EST HI 40 MIN: CPT | Performed by: INTERNAL MEDICINE

## 2025-04-03 PROCEDURE — 3074F SYST BP LT 130 MM HG: CPT | Performed by: INTERNAL MEDICINE

## 2025-04-03 PROCEDURE — 3008F BODY MASS INDEX DOCD: CPT | Performed by: INTERNAL MEDICINE

## 2025-04-03 PROCEDURE — 3078F DIAST BP <80 MM HG: CPT | Performed by: INTERNAL MEDICINE

## 2025-04-03 PROCEDURE — 1036F TOBACCO NON-USER: CPT | Performed by: INTERNAL MEDICINE

## 2025-04-03 PROCEDURE — 90677 PCV20 VACCINE IM: CPT | Performed by: INTERNAL MEDICINE

## 2025-04-03 PROCEDURE — 90471 IMMUNIZATION ADMIN: CPT | Performed by: INTERNAL MEDICINE

## 2025-04-03 RX ORDER — ESTRADIOL 0.1 MG/G
2 CREAM VAGINAL 2 TIMES WEEKLY
Qty: 42.5 G | Refills: 0 | Status: SHIPPED | OUTPATIENT
Start: 2025-04-03

## 2025-04-03 RX ORDER — ESTRADIOL 0.1 MG/G
2 CREAM VAGINAL 2 TIMES WEEKLY
COMMUNITY
End: 2025-04-03 | Stop reason: SDUPTHER

## 2025-04-03 ASSESSMENT — PATIENT HEALTH QUESTIONNAIRE - PHQ9
2. FEELING DOWN, DEPRESSED OR HOPELESS: NOT AT ALL
1. LITTLE INTEREST OR PLEASURE IN DOING THINGS: NOT AT ALL
SUM OF ALL RESPONSES TO PHQ9 QUESTIONS 1 AND 2: 0

## 2025-04-03 ASSESSMENT — ENCOUNTER SYMPTOMS: DEPRESSION: 0

## 2025-04-03 NOTE — PROGRESS NOTES
Subjective   Patient ID: Cherelle Hewitt is a 59 y.o. female who presents for Follow-up.    HPI   Blood pressure okay without amlodipine.  On metoprolol  Sees cardiologist  [facility].  On duloxetine for fibromyalgia.  Symptoms fairly controlled    Lost more weight on medication.  Tolerated    Taking thyroxine.  No worsening fatigue constipation    Need estrogen cream for her atrophy    Tolerating CPAP.  Wakes up refreshed  Review of Systems  No fever chills night sweats  No cough chest pain shortness of breath or palpitation  No anxiety or depression  No rectal bleed or bowel changes  Objective   /72   Pulse 69   Ht 1.524 m (5')   Wt 74.4 kg (164 lb)   SpO2 96%   BMI 32.03 kg/m²     Physical Exam    Constitutional:       Appearance: Normal appearance.   HENT:      Head: Normocephalic.   Cardiovascular:      Rate and Rhythm: Normal rate and regular rhythm.   Pulmonary:      Effort: Pulmonary effort is normal.      Breath sounds: Normal breath sounds.   Abdominal:      General: Abdomen is flat. There is no distension.      Palpations: There is no mass.      Tenderness: There is no abdominal tenderness. There is no guarding or rebound.   Musculoskeletal:      Cervical back: Neck supple.   Neurological:      Mental Status: She is alert.    No goiter or nodules  Assessment/Plan   Problem List Items Addressed This Visit             ICD-10-CM    Essential hypertension - Primary I10    Mixed hyperlipidemia E78.2    Hypothyroidism E03.9    JULIANA on CPAP G47.33     Other Visit Diagnoses         Codes    Encounter for screening mammogram for breast cancer     Z12.31    Relevant Orders    BI mammo bilateral screening tomosynthesis          Blood pressure okay without amlodipine  Continue statin  Continue thyroxine.  Clinically euthyroid  Continue CPAP  See cardiology and endocrinology and pain management as scheduled  Estrace cream to be used twice weekly  Mammogram ordered  Follow-up in 6 months for  physical  Prevnar 20 given.  She had shingles twice and does not want Shingrix     Patient was identified as a fall risk. Risk prevention instructions provided.

## 2025-04-05 DIAGNOSIS — E78.2 MIXED HYPERLIPIDEMIA: ICD-10-CM

## 2025-04-07 RX ORDER — SIMVASTATIN 40 MG/1
40 TABLET, FILM COATED ORAL NIGHTLY
Qty: 90 TABLET | Refills: 3 | Status: SHIPPED | OUTPATIENT
Start: 2025-04-07 | End: 2026-04-07

## 2025-06-04 DIAGNOSIS — M79.7 FIBROMYALGIA MUSCLE PAIN: ICD-10-CM

## 2025-06-04 RX ORDER — DULOXETIN HYDROCHLORIDE 60 MG/1
60 CAPSULE, DELAYED RELEASE ORAL DAILY
Qty: 90 CAPSULE | Refills: 1 | Status: SHIPPED | OUTPATIENT
Start: 2025-06-04

## 2025-06-25 DIAGNOSIS — E03.9 HYPOTHYROIDISM, UNSPECIFIED TYPE: ICD-10-CM

## 2025-06-25 RX ORDER — LEVOTHYROXINE SODIUM 75 UG/1
TABLET ORAL
Qty: 96 TABLET | Refills: 3 | Status: SHIPPED | OUTPATIENT
Start: 2025-06-25

## 2025-10-14 ENCOUNTER — APPOINTMENT (OUTPATIENT)
Dept: PRIMARY CARE | Facility: CLINIC | Age: 60
End: 2025-10-14
Payer: COMMERCIAL